# Patient Record
Sex: MALE | Race: WHITE | NOT HISPANIC OR LATINO | Employment: OTHER | URBAN - NONMETROPOLITAN AREA
[De-identification: names, ages, dates, MRNs, and addresses within clinical notes are randomized per-mention and may not be internally consistent; named-entity substitution may affect disease eponyms.]

---

## 2020-10-15 ENCOUNTER — OFFICE VISIT CONVERTED (OUTPATIENT)
Dept: FAMILY MEDICINE CLINIC | Facility: CLINIC | Age: 45
End: 2020-10-15
Attending: FAMILY MEDICINE

## 2021-05-10 NOTE — H&P
History and Physical      Patient Name: Enrrique Brooke   Patient ID: [id number]   Sex: Male   YOB: 1975        Visit Date: October 15, 2020    Provider: Mj Vaca DO   Location: [facility]   Location Address: [address] [place] [address]  [id number]   Location Phone: [phone]          Chief Complaint  · Here to establish care, hasn't been seen since the office was in [place] a long time ago.  · Keeps getting places on his arms that scab over and don't go away.      History Of Present Illness  Enrrique Brooke is a 45 year old male who presents for evaluation and treatment of:        Patient presents establish care with main complaint of having rash on his arms bilaterally and has been there for a month or so.  Has not really tried any thing to treat it over-the-counter he has some small lesions on his forearms mostly that looks almost like he picked at home, he states someone will open up and almost ulcerated and others are scratching.  Each wound looks different there is about 5-6 on each arm wound size of a nickel.  Not really painful or causing any discomfort or stress, has never had before.  He had regular sun exposure for extended times on his arms as a        Allergy List    Allergies Reconciled  Social History  Finding Status Start/Stop Quantity Notes   Alcohol Never --/-- --  --     --  --/-- --  --    Tobacco Never --/-- --  --          Review of Systems  · Constitutional  o Denies  o : fever, fatigue, weight loss, weight gain  · HENT  o Denies  o : nasal congestion, postnasal drip  · Cardiovascular  o Denies  o : lower extremity edema, claudication, chest pressure, palpitations  · Respiratory  o Denies  o : shortness of breath, wheezing, cough, hemoptysis, dyspnea on exertion  · Gastrointestinal  o Denies  o : nausea, vomiting, diarrhea, constipation, abdominal pain  · Integument  o Admits  o : rash  o Denies  o : itching,  skin dryness, hair growth change  · Psychiatric  o Denies  o : anxiety, depression      Vitals  Date Time BP Position Site L\R Cuff Size HR RR TEMP (F) WT  HT  BMI kg/m2 BSA m2 O2 Sat FR L/min FiO2        10/15/2020 02:26 /97 Sitting    83 - R 18 96.9 263lbs 2oz 6'   35.69 2.46 96 %  21%          Physical Examination  · Constitutional  o Appearance  o : no acute distress, well-nourished  · Head and Face  o Head  o :   § Inspection  § : atraumatic, normocephalic  o Face  o :   § Inspection  § : no facial lesions  · Ears, Nose, Mouth and Throat  o Ears  o :   § External Ears  § : normal  o Nose  o :   § Intranasal Exam  § : nares patent  o Oral Cavity  o :   § Oral Mucosa  § : moist mucous membranes  · Neck  o Thyroid  o : gland size normal, nontender, no nodules or masses present on palpation, symmetric  · Respiratory  o Respiratory Effort  o : breathing comfortably, symmetric chest rise  o Auscultation of Lungs  o : clear to asculatation bilaterally, no wheezes, rales, or rhonchii  · Cardiovascular  o Heart  o :   § Auscultation of Heart  § : regular rate and rhythm, no murmurs, rubs, or gallops  o Peripheral Vascular System  o :   § Extremities  § : no edema  · Lymphatic  o Axilla  o : no lymphadenopathy present  · Skin and Subcutaneous Tissue  o General Inspection  o : Rash on his arms with some or most nickel sized, ulcerated appearance in some places and some others are almost crusted over and hypopigmented   · Neurologic  o Mental Status Examination  o :   § Orientation  § : grossly oriented to person, place and time  o Gait and Station  o :   § Gait Screening  § : normal gait  · Psychiatric  o General  o : normal mood and affect          Assessment  · Screening for depression     V79.0/Z13.89  · Establishing care with new doctor, encounter for     V65.8/Z76.89  · Folliculitis     704.8/L73.9  · Solar dermatitis     692.70/L57.8         Rash or lesions on forearms bilaterally  DDx folliculitis  porphyruria actinic keratosis or solar related dermatitis of some sort  Could be multiple things going on, conservatively advised we will do some mupirocin to treat some of the areas like a slight infection is going on and then consider other etiology with further research and likely will refer to dermatology biopsy and advised sunscreen  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 and lesions are on the most on exposed area seems most likely related to sun damage    We will follow up tomorrow next week and see if continues and do research and follow-up on referring to dermatology     Problems Reconciled  Plan  · Orders  o ACO-18: Negative screen for clinical depression using a standardized tool () - V79.0/Z13.89 - 10/15/2020  o ALEIDA Report (KASPR) - 704.8/L73.9, 692.70/L57.8 - 10/15/2020  o ACO-39: Current medications updated and reviewed (1159F, ) - 704.8/L73.9, 692.70/L57.8 - 10/15/2020  o DERMATOLOGY CONSULTATION (DERMA) - 704.8/L73.9, 692.70/L57.8 - 10/15/2020   I talked to him about this may be our best option so ill call 10/16 and let him know please let me call first before telling him when h has appointment thanks  · Medications  o Medications have been Reconciled  o Transition of Care or Provider Policy  · Instructions  o Depression Screen completed and scanned into the EMR under the designated folder within the patient's documents.  o Today's PHQ-9 result is _3__  o Patient was educated/instructed on their diagnosis, treatment and medications prior to discharge from the clinic today.  o Patient instructed to seek medical attention urgently for new or worsening symptoms.  o Trusted Web sites were provided.  o Call the office with any concerns or questions.  o Bring all medicines with their bottles to each office visit.  o Risks, benefits, and alternatives were discussed with the patient. The patient is aware of risks associated with:  o Chronic conditions reviewed and taken into consideration for  today's treatment plan.  o Electronically Identified Patient Education Materials Provided Electronically  · Disposition  o Call or Return if symptoms worsen or persist.  o Return Visit Request in/on 1 month +/- 2 days ([id number]).            Electronically Signed by: Mj Vaca DO -Author on October 15, 2020 06:52:11 PM

## 2021-05-14 VITALS
HEIGHT: 72 IN | WEIGHT: 263.12 LBS | RESPIRATION RATE: 18 BRPM | OXYGEN SATURATION: 96 % | DIASTOLIC BLOOD PRESSURE: 97 MMHG | BODY MASS INDEX: 35.64 KG/M2 | HEART RATE: 83 BPM | TEMPERATURE: 96.9 F | SYSTOLIC BLOOD PRESSURE: 148 MMHG

## 2021-07-26 ENCOUNTER — OFFICE VISIT (OUTPATIENT)
Dept: FAMILY MEDICINE CLINIC | Facility: CLINIC | Age: 46
End: 2021-07-26

## 2021-07-26 VITALS
HEIGHT: 72 IN | TEMPERATURE: 97.4 F | OXYGEN SATURATION: 96 % | HEART RATE: 59 BPM | SYSTOLIC BLOOD PRESSURE: 144 MMHG | DIASTOLIC BLOOD PRESSURE: 77 MMHG | RESPIRATION RATE: 18 BRPM | WEIGHT: 261 LBS | BODY MASS INDEX: 35.35 KG/M2

## 2021-07-26 DIAGNOSIS — R21 RASH: Primary | ICD-10-CM

## 2021-07-26 DIAGNOSIS — Z00.00 ANNUAL PHYSICAL EXAM: ICD-10-CM

## 2021-07-26 DIAGNOSIS — Z12.5 SCREENING FOR PROSTATE CANCER: ICD-10-CM

## 2021-07-26 DIAGNOSIS — E66.9 OBESITY (BMI 30-39.9): ICD-10-CM

## 2021-07-26 DIAGNOSIS — Z13.220 SCREENING FOR LIPID DISORDERS: ICD-10-CM

## 2021-07-26 PROCEDURE — 99203 OFFICE O/P NEW LOW 30 MIN: CPT | Performed by: FAMILY MEDICINE

## 2021-07-26 RX ORDER — PREDNISONE 20 MG/1
TABLET ORAL
Qty: 15 TABLET | Refills: 0 | Status: SHIPPED | OUTPATIENT
Start: 2021-07-26 | End: 2021-08-03

## 2021-07-26 NOTE — PROGRESS NOTES
"Chief Complaint  Rash (stomach, back and legs (small red bumps ))    Subjective          Enrrique Brooke presents to [facility]  He is here today to establish relations new patient.  He has past medical history significant for obesity.    He is having a rash on his abdomen and legs that has been present for the past month. He says that he thinks it is getting worse. He says it itches. He denies fever or chills. He says that it happened on his back were he was ridding in a tractor seat.     The patient has no other complaints today and denies chest pain, shortness of breath, weakness, numbness, nausea, vomiting, diarrhea, dizziness or syncopal event.          Objective   Vital Signs:   /77 (BP Location: Left arm, Patient Position: Sitting)   Pulse 59   Temp 97.4 °F (36.3 °C)   Resp 18   Ht 182.9 cm (72\")   Wt 118 kg (261 lb)   SpO2 96%   BMI 35.40 kg/m²     Physical Exam  Vitals reviewed.   Constitutional:       Appearance: Normal appearance. He is well-developed. He is obese.   HENT:      Head: Normocephalic and atraumatic.      Right Ear: External ear normal.      Left Ear: External ear normal.      Mouth/Throat:      Pharynx: No oropharyngeal exudate.   Eyes:      Conjunctiva/sclera: Conjunctivae normal.      Pupils: Pupils are equal, round, and reactive to light.   Neck:      Vascular: No carotid bruit.   Cardiovascular:      Rate and Rhythm: Normal rate and regular rhythm.      Heart sounds: No murmur heard.   No friction rub. No gallop.    Pulmonary:      Effort: Pulmonary effort is normal.      Breath sounds: Normal breath sounds. No wheezing or rhonchi.   Abdominal:      General: Bowel sounds are normal. There is no distension.      Palpations: Abdomen is soft.      Tenderness: There is no abdominal tenderness.   Skin:     General: Skin is warm and dry.             Comments: Erythematous slightly raised 2 to 3 mm lesions diffusely spread on the middle to " lower abdomen low back buttocks posterior anterior thighs.  No sign of infection or drainage appreciated.   Neurological:      Mental Status: He is alert and oriented to person, place, and time.      Cranial Nerves: No cranial nerve deficit.      Motor: No weakness.   Psychiatric:         Mood and Affect: Mood and affect normal.         Behavior: Behavior normal.         Thought Content: Thought content normal.         Judgment: Judgment normal.        Result Review :                 Assessment and Plan    Diagnoses and all orders for this visit:    1. Rash (Primary)  Comments:  The patient's rash appears to be inflammatory reaction to tightfitting clothing and sweating.  He was given a prescription today of steroids take as directed.  Orders:  -     predniSONE (DELTASONE) 20 MG tablet; Take 3 tablets by mouth Daily for 3 days, THEN 2 tablets Daily for 3 days, THEN 1 tablet Daily for 2 days.  Dispense: 15 tablet; Refill: 0    2. Screening for lipid disorders  -     Lipid Panel; Future    3. Screening for prostate cancer  -     PSA Screen; Future    4. Annual physical exam  -     Comprehensive Metabolic Panel; Future  -     CBC & Differential; Future  -     TSH; Future    5. Obesity (BMI 30-39.9)  Assessment & Plan:  Diet, exercise and weight loss were highly encouraged today's visit.        Follow Up   Return in about 4 weeks (around 8/23/2021).  Patient was given instructions and counseling regarding his condition or for health maintenance advice. Please see specific information pulled into the AVS if appropriate.

## 2021-08-16 ENCOUNTER — TELEPHONE (OUTPATIENT)
Dept: FAMILY MEDICINE CLINIC | Facility: CLINIC | Age: 46
End: 2021-08-16

## 2021-08-16 ENCOUNTER — LAB (OUTPATIENT)
Dept: LAB | Facility: HOSPITAL | Age: 46
End: 2021-08-16

## 2021-08-16 DIAGNOSIS — Z00.00 ANNUAL PHYSICAL EXAM: ICD-10-CM

## 2021-08-16 DIAGNOSIS — Z13.220 SCREENING FOR LIPID DISORDERS: ICD-10-CM

## 2021-08-16 DIAGNOSIS — Z12.5 SCREENING FOR PROSTATE CANCER: ICD-10-CM

## 2021-08-16 LAB
ALBUMIN SERPL-MCNC: 4.1 G/DL (ref 3.5–5.2)
ALBUMIN/GLOB SERPL: 1.6 G/DL
ALP SERPL-CCNC: 70 U/L (ref 39–117)
ALT SERPL W P-5'-P-CCNC: 46 U/L (ref 1–41)
ANION GAP SERPL CALCULATED.3IONS-SCNC: 9.5 MMOL/L (ref 5–15)
AST SERPL-CCNC: 25 U/L (ref 1–40)
BASOPHILS # BLD AUTO: 0.03 10*3/MM3 (ref 0–0.2)
BASOPHILS NFR BLD AUTO: 0.4 % (ref 0–1.5)
BILIRUB SERPL-MCNC: 1.9 MG/DL (ref 0–1.2)
BUN SERPL-MCNC: 16 MG/DL (ref 6–20)
BUN/CREAT SERPL: 17.2 (ref 7–25)
CALCIUM SPEC-SCNC: 8.7 MG/DL (ref 8.6–10.5)
CHLORIDE SERPL-SCNC: 104 MMOL/L (ref 98–107)
CHOLEST SERPL-MCNC: 169 MG/DL (ref 0–200)
CO2 SERPL-SCNC: 23.5 MMOL/L (ref 22–29)
CREAT SERPL-MCNC: 0.93 MG/DL (ref 0.76–1.27)
DEPRECATED RDW RBC AUTO: 38.8 FL (ref 37–54)
EOSINOPHIL # BLD AUTO: 0.21 10*3/MM3 (ref 0–0.4)
EOSINOPHIL NFR BLD AUTO: 2.8 % (ref 0.3–6.2)
ERYTHROCYTE [DISTWIDTH] IN BLOOD BY AUTOMATED COUNT: 12.8 % (ref 12.3–15.4)
GFR SERPL CREATININE-BSD FRML MDRD: 88 ML/MIN/1.73
GLOBULIN UR ELPH-MCNC: 2.6 GM/DL
GLUCOSE SERPL-MCNC: 98 MG/DL (ref 65–99)
HCT VFR BLD AUTO: 45.1 % (ref 37.5–51)
HDLC SERPL-MCNC: 47 MG/DL (ref 40–60)
HGB BLD-MCNC: 15.5 G/DL (ref 13–17.7)
IMM GRANULOCYTES # BLD AUTO: 0.01 10*3/MM3 (ref 0–0.05)
IMM GRANULOCYTES NFR BLD AUTO: 0.1 % (ref 0–0.5)
LDLC SERPL CALC-MCNC: 109 MG/DL (ref 0–100)
LDLC/HDLC SERPL: 2.32 {RATIO}
LYMPHOCYTES # BLD AUTO: 1.91 10*3/MM3 (ref 0.7–3.1)
LYMPHOCYTES NFR BLD AUTO: 25.8 % (ref 19.6–45.3)
MCH RBC QN AUTO: 29 PG (ref 26.6–33)
MCHC RBC AUTO-ENTMCNC: 34.4 G/DL (ref 31.5–35.7)
MCV RBC AUTO: 84.5 FL (ref 79–97)
MONOCYTES # BLD AUTO: 0.69 10*3/MM3 (ref 0.1–0.9)
MONOCYTES NFR BLD AUTO: 9.3 % (ref 5–12)
NEUTROPHILS NFR BLD AUTO: 4.54 10*3/MM3 (ref 1.7–7)
NEUTROPHILS NFR BLD AUTO: 61.6 % (ref 42.7–76)
NRBC BLD AUTO-RTO: 0 /100 WBC (ref 0–0.2)
PLATELET # BLD AUTO: 260 10*3/MM3 (ref 140–450)
PMV BLD AUTO: 10.1 FL (ref 6–12)
POTASSIUM SERPL-SCNC: 3.8 MMOL/L (ref 3.5–5.2)
PROT SERPL-MCNC: 6.7 G/DL (ref 6–8.5)
PSA SERPL-MCNC: 0.33 NG/ML (ref 0–4)
RBC # BLD AUTO: 5.34 10*6/MM3 (ref 4.14–5.8)
SODIUM SERPL-SCNC: 137 MMOL/L (ref 136–145)
TRIGL SERPL-MCNC: 65 MG/DL (ref 0–150)
TSH SERPL DL<=0.05 MIU/L-ACNC: 1.75 UIU/ML (ref 0.27–4.2)
VLDLC SERPL-MCNC: 13 MG/DL (ref 5–40)
WBC # BLD AUTO: 7.39 10*3/MM3 (ref 3.4–10.8)

## 2021-08-16 PROCEDURE — 85025 COMPLETE CBC W/AUTO DIFF WBC: CPT

## 2021-08-16 PROCEDURE — G0103 PSA SCREENING: HCPCS

## 2021-08-16 PROCEDURE — 80053 COMPREHEN METABOLIC PANEL: CPT

## 2021-08-16 PROCEDURE — 36415 COLL VENOUS BLD VENIPUNCTURE: CPT

## 2021-08-16 PROCEDURE — 84443 ASSAY THYROID STIM HORMONE: CPT

## 2021-08-16 PROCEDURE — 80061 LIPID PANEL: CPT

## 2021-08-16 NOTE — TELEPHONE ENCOUNTER
Caller: ELIEZER LUKE    Relationship: Emergency Contact    Best call back number: [phone]     What medication are you requesting: SOMETHING FOR THE RASH    What are your current symptoms: THE RASH IS GETTING WORSE. PLEASE CALL AND ADVISE.     How long have you been experiencing symptoms: 2 WEEKS AGO     Have you had these symptoms before:    [] Yes  [x] No    Have you been treated for these symptoms before:   [] Yes  [x] No    If a prescription is needed, what is your preferred pharmacy and phone number:     [facility]   [address]   [phone]     Additional notes: WIFE STATED THAT THE LAST TIME HE WAS IN IT WASN'T AS BAD. NOW, ITS SPREADING EVERYWHERE. THE PREZONE HELPED BUT IT CAME BACK.

## 2021-08-19 ENCOUNTER — TELEPHONE (OUTPATIENT)
Dept: FAMILY MEDICINE CLINIC | Facility: CLINIC | Age: 46
End: 2021-08-19

## 2021-08-19 NOTE — TELEPHONE ENCOUNTER
----- Message from Elsy Hilario DO sent at 8/17/2021 10:45 PM EDT -----  Kidney, liver, electrolyte, cholesterol, PSA, Thyroid, HGB, WBC, platelet levels.

## 2021-08-20 ENCOUNTER — OFFICE VISIT (OUTPATIENT)
Dept: FAMILY MEDICINE CLINIC | Facility: CLINIC | Age: 46
End: 2021-08-20

## 2021-08-20 VITALS
SYSTOLIC BLOOD PRESSURE: 137 MMHG | HEART RATE: 74 BPM | BODY MASS INDEX: 35.18 KG/M2 | OXYGEN SATURATION: 97 % | WEIGHT: 259.7 LBS | RESPIRATION RATE: 20 BRPM | HEIGHT: 72 IN | TEMPERATURE: 97 F | DIASTOLIC BLOOD PRESSURE: 74 MMHG

## 2021-08-20 DIAGNOSIS — R21 RASH: ICD-10-CM

## 2021-08-20 DIAGNOSIS — E66.9 OBESITY (BMI 30-39.9): Primary | Chronic | ICD-10-CM

## 2021-08-20 DIAGNOSIS — R74.01 ELEVATED ALT MEASUREMENT: ICD-10-CM

## 2021-08-20 PROCEDURE — 99214 OFFICE O/P EST MOD 30 MIN: CPT | Performed by: FAMILY MEDICINE

## 2021-08-20 RX ORDER — PREDNISONE 20 MG/1
TABLET ORAL
Qty: 17 TABLET | Refills: 1 | Status: SHIPPED | OUTPATIENT
Start: 2021-08-20 | End: 2021-08-28

## 2021-08-20 NOTE — PROGRESS NOTES
"Chief Complaint  Rash    Subjective          Enrrique Brooke presents to [facility]  He is here today for an acute visit.  He has past medical history significant for elevated ALT and obesity.    He is continuing to have the abdominal rash as well as a rash on his lateral thighs and now on his bilateral arms.  At his last visit he was given a steroid pack that dried up his rash for about one week. He says that it has usually been more mild in the past but now it has gotten worse.  He denies any fevers or chills.    The patient has no other complaints today and denies chest pain, shortness of breath, weakness, numbness, nausea, vomiting, diarrhea, dizziness or syncopal event.        Objective   Vital Signs:   /74 (BP Location: Left arm, Patient Position: Sitting)   Pulse 74   Temp 97 °F (36.1 °C)   Resp 20   Ht 182.9 cm (72\")   Wt 118 kg (259 lb 11.2 oz)   SpO2 97%   BMI 35.22 kg/m²     Physical Exam  Vitals reviewed.   Constitutional:       Appearance: Normal appearance. He is well-developed. He is obese.   HENT:      Head: Normocephalic and atraumatic.      Right Ear: External ear normal.      Left Ear: External ear normal.      Mouth/Throat:      Pharynx: No oropharyngeal exudate.   Eyes:      Conjunctiva/sclera: Conjunctivae normal.      Pupils: Pupils are equal, round, and reactive to light.   Neck:      Vascular: No carotid bruit.   Cardiovascular:      Rate and Rhythm: Normal rate and regular rhythm.      Heart sounds: No murmur heard.   No friction rub. No gallop.    Pulmonary:      Effort: Pulmonary effort is normal.      Breath sounds: Normal breath sounds. No wheezing or rhonchi.   Abdominal:      General: Bowel sounds are normal. There is no distension.      Palpations: Abdomen is soft.      Tenderness: There is no abdominal tenderness.   Skin:     General: Skin is warm and dry.             Comments: Macular papular rash on the lateral thighs, " abdomen, torso, bilateral upper arms and forearms.   Neurological:      Mental Status: He is alert and oriented to person, place, and time.      Cranial Nerves: No cranial nerve deficit.      Motor: No weakness.   Psychiatric:         Mood and Affect: Mood and affect normal.         Behavior: Behavior normal.         Thought Content: Thought content normal.         Judgment: Judgment normal.        Result Review :     CMP    CMP 8/16/21   Glucose 98   BUN 16   Creatinine 0.93   eGFR Non African Am 88   Sodium 137   Potassium 3.8   Chloride 104   Calcium 8.7   Albumin 4.10   Total Bilirubin 1.9 (A)   Alkaline Phosphatase 70   AST (SGOT) 25   ALT (SGPT) 46 (A)   (A) Abnormal value            CBC    CBC 8/16/21   WBC 7.39   RBC 5.34   Hemoglobin 15.5   Hematocrit 45.1   MCV 84.5   MCH 29.0   MCHC 34.4   RDW 12.8   Platelets 260           Lipid Panel    Lipid Panel 8/16/21   Total Cholesterol 169   Triglycerides 65   HDL Cholesterol 47   VLDL Cholesterol 13   LDL Cholesterol  109 (A)   LDL/HDL Ratio 2.32   (A) Abnormal value            TSH    TSH 8/16/21   TSH 1.750                     Assessment and Plan    Diagnoses and all orders for this visit:    1. Obesity (BMI 30-39.9) (Primary)  Assessment & Plan:  Patient's (Body mass index is 35.22 kg/m².) indicates that they are obese (BMI >30) with health conditions that include none . Weight is improving with lifestyle modifications. BMI is is above average; BMI management plan is completed. We discussed low calorie, low carb based diet program, portion control and increasing exercise.    The patient's lost 2 pounds since his last visit.  He was encouraged to lose 50 more pounds.      2. Rash  Comments:  The patient will be given another round of steroids and he was given referral today to allergy to be manage according findings.  Orders:  -     Ambulatory Referral to Dermatology    3. Elevated ALT measurement  Assessment & Plan:  The patient was educated about fatty liver  disease and how most likely is ALT is due to that.  Diet, weight loss and exercise were highly encouraged today's visit.      Other orders  -     triamcinolone (KENALOG) 0.1 % ointment; Apply  topically to the appropriate area as directed 2 (Two) Times a Day.  Dispense: 80 g; Refill: 1  -     predniSONE (DELTASONE) 20 MG tablet; Take 3 tablets by mouth Daily for 3 days, THEN 2 tablets Daily for 3 days, THEN 1 tablet Daily for 2 days.  Dispense: 17 tablet; Refill: 1    I spent 31 minutes caring for Enrrique on this date of service. This time includes time spent by me in the following activities:performing a medically appropriate examination and/or evaluation , counseling and educating the patient/family/caregiver, ordering medications, tests, or procedures, referring and communicating with other health care professionals  and documenting information in the medical record  Follow Up   No follow-ups on file.  Patient was given instructions and counseling regarding his condition or for health maintenance advice. Please see specific information pulled into the AVS if appropriate.

## 2021-08-21 NOTE — ASSESSMENT & PLAN NOTE
Patient's (Body mass index is 35.22 kg/m².) indicates that they are obese (BMI >30) with health conditions that include none . Weight is improving with lifestyle modifications. BMI is is above average; BMI management plan is completed. We discussed low calorie, low carb based diet program, portion control and increasing exercise.    The patient's lost 2 pounds since his last visit.  He was encouraged to lose 50 more pounds.

## 2021-08-21 NOTE — ASSESSMENT & PLAN NOTE
The patient was educated about fatty liver disease and how most likely is ALT is due to that.  Diet, weight loss and exercise were highly encouraged today's visit.

## 2022-03-28 ENCOUNTER — TELEPHONE (OUTPATIENT)
Dept: FAMILY MEDICINE CLINIC | Facility: CLINIC | Age: 47
End: 2022-03-28

## 2022-03-28 DIAGNOSIS — L98.9 SKIN LESION: Primary | ICD-10-CM
